# Patient Record
Sex: FEMALE | Race: OTHER | HISPANIC OR LATINO | URBAN - METROPOLITAN AREA
[De-identification: names, ages, dates, MRNs, and addresses within clinical notes are randomized per-mention and may not be internally consistent; named-entity substitution may affect disease eponyms.]

---

## 2023-10-27 PROBLEM — D64.9 ANEMIA: Status: ACTIVE | Noted: 2023-10-27

## 2023-10-27 RX ORDER — MAGNESIUM HYDROXIDE 400 MG/5ML
SUSPENSION, ORAL (FINAL DOSE FORM) ORAL
COMMUNITY

## 2023-10-31 ENCOUNTER — OFFICE VISIT (OUTPATIENT)
Dept: DERMATOLOGY | Facility: CLINIC | Age: 33
End: 2023-10-31
Payer: COMMERCIAL

## 2023-10-31 DIAGNOSIS — L43.9 LICHEN PLANUS: Primary | ICD-10-CM

## 2023-10-31 PROCEDURE — 99204 OFFICE O/P NEW MOD 45 MIN: CPT | Performed by: NURSE PRACTITIONER

## 2023-10-31 ASSESSMENT — ITCH NUMERIC RATING SCALE: HOW SEVERE IS YOUR ITCHING?: 0

## 2023-10-31 ASSESSMENT — DERMATOLOGY PATIENT ASSESSMENT: DO YOU HAVE ANY NEW OR CHANGING LESIONS: NO

## 2023-10-31 ASSESSMENT — DERMATOLOGY QUALITY OF LIFE (QOL) ASSESSMENT
ARE THERE EXCLUSIONS OR EXCEPTIONS FOR THE QUALITY OF LIFE ASSESSMENT: NO
DATE THE QUALITY-OF-LIFE ASSESSMENT WAS COMPLETED: 66778

## 2023-10-31 NOTE — PROGRESS NOTES
Subjective     Nara Peterson is a 33 y.o. female who presents for the following: Rash.  New patient visit.  Discoloration to bilateral feet.  Patient describes skin as peeling off nontender no pain no itch present for 17 years has used over-the-counter creams.        Review of Systems:  No other skin or systemic complaints other than what is documented elsewhere in the note.    The following portions of the chart were reviewed this encounter and updated as appropriate:       Skin Cancer History  No skin cancer on file.    Specialty Problems    None    Past Medical History:  Nara Peterson  has a past medical history of Encounter for full-term uncomplicated delivery, Other conditions influencing health status, and Personal history of other complications of pregnancy, childbirth and the puerperium.    Past Surgical History:  Nara Peterson  has no past surgical history on file.    Family History:  Patient family history is not on file.    Social History:  Nara Peterson  has no history on file for tobacco use, alcohol use, and drug use.    Allergies:  Patient has no known allergies.    Current Medications / CAM's:    Current Outpatient Medications:     prenatal vit 40-iron-folic-dha (Prenatal Multi-DHA, algal oil,) 27mg iron- 800 mcg-250 mg capsule, Take by mouth., Disp: , Rfl:      Objective   Well appearing patient in no apparent distress; mood and affect are within normal limits.    A full examination was performed including scalp, head, eyes, ears, nose, lips, neck, chest, axillae, abdomen, back, buttocks, bilateral upper extremities, bilateral lower extremities, hands, feet, fingers, toes, fingernails, and toenails. All findings within normal limits unless otherwise noted below.    Assessment/Plan   1. Lichen planus  Left Ankle - Posterior, Left Foot - Anterior, Right Ankle - Posterior, Right Foot - Anterior  Markedly thickened verrucous plaques with evidence of post inflammatory hyperpigmentation for the last 17 years.   She has videos of her manually peeling layers of skin.      PLAN:  Will attempt to use clobetasol 0.05% cream twice daily to affected areas  Discussed that areas of PIH can improve over time, but given the location and the length of time she has experienced these plaques complete resolution is unlikely  RTC in 6 weeks

## 2023-12-04 ENCOUNTER — APPOINTMENT (OUTPATIENT)
Dept: DERMATOLOGY | Facility: CLINIC | Age: 33
End: 2023-12-04
Payer: COMMERCIAL